# Patient Record
Sex: MALE
[De-identification: names, ages, dates, MRNs, and addresses within clinical notes are randomized per-mention and may not be internally consistent; named-entity substitution may affect disease eponyms.]

---

## 2017-05-11 ENCOUNTER — HOSPITAL ENCOUNTER (EMERGENCY)
Dept: HOSPITAL 42 - ED | Age: 52
Discharge: HOME | End: 2017-05-11
Payer: MEDICARE

## 2017-05-11 VITALS
OXYGEN SATURATION: 100 % | HEART RATE: 69 BPM | DIASTOLIC BLOOD PRESSURE: 58 MMHG | SYSTOLIC BLOOD PRESSURE: 134 MMHG | RESPIRATION RATE: 18 BRPM

## 2017-05-11 VITALS — BODY MASS INDEX: 40.2 KG/M2

## 2017-05-11 VITALS — TEMPERATURE: 98.1 F

## 2017-05-11 DIAGNOSIS — M62.838: ICD-10-CM

## 2017-05-11 DIAGNOSIS — R51: ICD-10-CM

## 2017-05-11 DIAGNOSIS — M54.2: Primary | ICD-10-CM
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
## 2019-01-04 ENCOUNTER — HOSPITAL ENCOUNTER (EMERGENCY)
Dept: HOSPITAL 42 - ED | Age: 54
Discharge: LEFT BEFORE DISCHARGE | End: 2019-01-04
Payer: MEDICARE

## 2019-01-04 VITALS — RESPIRATION RATE: 18 BRPM | TEMPERATURE: 98.4 F

## 2019-01-04 VITALS — OXYGEN SATURATION: 98 % | DIASTOLIC BLOOD PRESSURE: 90 MMHG | SYSTOLIC BLOOD PRESSURE: 139 MMHG | HEART RATE: 88 BPM

## 2019-01-04 VITALS — BODY MASS INDEX: 40.2 KG/M2

## 2019-01-04 DIAGNOSIS — Z98.84: ICD-10-CM

## 2019-01-04 DIAGNOSIS — I10: Primary | ICD-10-CM

## 2019-01-04 DIAGNOSIS — M79.605: ICD-10-CM

## 2019-01-04 DIAGNOSIS — R73.9: ICD-10-CM

## 2019-01-04 DIAGNOSIS — J44.9: ICD-10-CM

## 2019-01-04 DIAGNOSIS — F17.210: ICD-10-CM

## 2019-01-04 PROCEDURE — 96372 THER/PROPH/DIAG INJ SC/IM: CPT

## 2019-01-04 PROCEDURE — 99284 EMERGENCY DEPT VISIT MOD MDM: CPT

## 2019-01-04 PROCEDURE — 73560 X-RAY EXAM OF KNEE 1 OR 2: CPT

## 2019-01-04 PROCEDURE — 93971 EXTREMITY STUDY: CPT

## 2019-01-04 PROCEDURE — 82948 REAGENT STRIP/BLOOD GLUCOSE: CPT

## 2019-01-04 PROCEDURE — 73610 X-RAY EXAM OF ANKLE: CPT

## 2019-01-04 NOTE — RAD
PROCEDURE:  Left Knee Radiographs.



HISTORY:

leg pain



COMPARISON:

None available.



FINDINGS:

Two views 



BONES:

No acute displaced fracture. 



JOINTS:

No dislocation. 



JOINT EFFUSION:

Moderate to large suprapatellar joint effusion. 



OTHER FINDINGS:

None.



IMPRESSION:

Moderate to large suprapatellar joint effusion. 



No acute displaced fracture or dislocation identified.  If symptoms 

persist, or if there is continued clinical concern, x-ray follow-up 

in 7-10 days should be considered.

## 2019-01-04 NOTE — RAD
PROCEDURE:  Left Ankle Radiographs.



HISTORY:

 leg pain 



COMPARISON:

None available.



FINDINGS:



BONES:

No acute displaced fracture.  Calcaneal enthesophyte/heel spur. 



JOINTS:

No dislocation. 



SOFT TISSUES:

Marked soft tissue swelling/edema.  No evidence of radiopaque foreign 

body. 



OTHER FINDINGS:

None.



IMPRESSION:

Marked soft tissue swelling/edema. 



No acute displaced fracture, dislocation, or significant joint 

effusion identified.



If symptoms persist or if there is clinical concern, x-ray follow-up 

in 7-10 days should be considered.

## 2019-01-04 NOTE — US
PROCEDURE:  Left lower extremity venous US



HISTORY:

Leg pain and swelling. Evaluate for DVT.



PHYSICIAN(S):  Fabian Perez MD.



TECHNIQUE:

Duplex sonography and color-flow Doppler with graded compression were 

used to evaluate the deep venous system of the left lower extremity. 



FINDINGS:

The visualized deep venous system of the left lower extremity is 

sonographically normal and compressible. Normal wave forms and 

augmentation are seen. There is no sonographic evidence for deep 

venous thrombosis in the visualized segments of the left lower 

extremity.



IMPRESSION:

1. No sonographic evidence for deep venous thrombosis in the 

visualized segments of the left lower extremity.

## 2019-01-04 NOTE — ED PDOC
Arrival/HPI





- General


Chief Complaint: Lower Extremity Problem/Injury


Time Seen by Provider: 01/04/19 09:15


Historian: Patient





- History of Present Illness


Narrative History of Present Illness (Text): 





01/04/19 9:49


53 year old male, with past medical history of hypertension, presents to the ED 

complaining of left lower leg pain x 1 week. Patient states pain starts from the

left knee and radiates down to his lower leg, associated with intermittent 

paresthesias at the level of the knee. Patient informs difficulty bearing weight

and worsening symptoms with movement. Patient denies taking any medication for 

the pain. Patient denies any recent trauma or injury to the left leg. Patient 

denies any recent surgery or immobilization. Of note, patient did not take his 

HTN medication today. Patient denies any fevers, chills, headache, dizziness, 

changes in vision, chest pain, shortness of breath, abdominal pain, nausea, 

vomiting, back pain, neck pain, or any other complaints. 


Time/Duration: 1 week


Symptom Onset: Gradual


Symptom Course: Unchanged


Activities at Onset: Light


Context: Home





Past Medical History





- Provider Review


Nursing Documentation Reviewed: Yes





- Cardiac


Hx Cardiac Disorders: Yes


Hx Hypertension: Yes





- Pulmonary


Hx Respiratory Disorders: Yes


Hx Chronic Obstructive Pulmonary Disease (COPD): Yes





- Neurological


Hx Neurological Disorder: No





- HEENT


Hx HEENT Disorder: No





- Renal


Hx Renal Disorder: No





- Endocrine/Metabolic


Hx Endocrine Disorders: No





- Hematological/Oncological


Hx Blood Disorders: No





- Integumentary


Hx Dermatological Disorder: No





- Musculoskeletal/Rheumatological


Hx Musculoskeletal Disorders: No





- Gastrointestinal


Hx Gastrointestinal Disorders: No





- Genitourinary/Gynecological


Hx Genitourinary Disorders: No





- Psychiatric


Hx Psychophysiologic Disorder: Yes


Hx Bipolar Disorder: Yes


Hx Depression: Yes


Hx Substance Use: No





- Surgical History


Hx Gastric Bypass Surgery: Yes (lap band)


Hx Orthopedic Surgery: Yes





- Anesthesia


Hx Anesthesia: Yes


Hx Anesthesia Reactions: No


Hx Malignant Hyperthermia: No





- Suicidal Assessment


Feels Threatened In Home Enviroment: No





Family/Social History





- Physician Review


Nursing Documentation Reviewed: Yes


Family/Social History: Unknown Family HX


Smoking Status: Heavy Smoker > 10 Cigarettes Daily


Hx Alcohol Use: No


Hx Substance Use: No





Allergies/Home Meds


Allergies/Adverse Reactions: 


Allergies





No Known Allergies Allergy (Verified 05/11/17 09:14)


   








Home Medications: 


                                    Home Meds











 Medication  Instructions  Recorded  Confirmed


 


Lisinopril 10 mg PO DAILY 03/26/14 05/11/17


 


Paxil 40 mg PO DAILY 03/26/14 05/11/17


 


Simvastatin 20 mg PO DAILY 03/26/14 05/11/17


 


Tamsulosin [Flomax] 0.4 mg PO DAILY 05/11/17 05/11/17


 


Thiothixene [Navane] 20 mg PO HS 05/11/17 05/11/17














Review of Systems





- Physician Review


All systems were reviewed & negative as marked: Yes





- Review of Systems


Constitutional: Normal.  absent: Fevers


Eyes: Normal.  absent: Vision Changes


ENT: Normal


Respiratory: Normal.  absent: SOB, Cough


Cardiovascular: Normal.  absent: Chest Pain


Gastrointestinal: Normal.  absent: Abdominal Pain, Diarrhea, Nausea, Vomiting


Genitourinary Male: Normal.  absent: Dysuria, Frequency, Urinary Output Changes


Musculoskeletal: Other (left lower leg pain).  absent: Back Pain, Neck Pain


Skin: Normal.  absent: Rash


Neurological: Normal.  absent: Headache, Dizziness, Focal Weakness, Gait Changes


Endocrine: Normal





Physical Exam


Vital Signs Reviewed: Yes





Vital Signs











  Temp Pulse Resp BP Pulse Ox


 


 01/04/19 09:14  98.4 F  73  18  138/101 H  96











Temperature: Afebrile


Blood Pressure: Hypertensive


Pulse: Regular


Respiratory Rate: Normal


Appearance: Positive for: Well-Appearing, Non-Toxic, Comfortable


Pain Distress: None


Mental Status: Positive for: Alert and Oriented X 3





- Systems Exam


Head: Present: Atraumatic, Normocephalic


Pupils: Present: PERRL


Extroacular Muscles: Present: EOMI


Conjunctiva: Present: Normal


Mouth: Present: Moist Mucous Membranes


Neck: Present: Normal Range of Motion


Respiratory/Chest: Present: Clear to Auscultation, Good Air Exchange.  No: 

Respiratory Distress, Accessory Muscle Use


Cardiovascular: Present: Regular Rate and Rhythm, Normal S1, S2.  No: Murmurs


Abdomen: No: Tenderness, Distention, Peritoneal Signs


Back: Present: Normal Inspection.  No: CVA Tenderness, Midline Tenderness, 

Paraspinal Tenderness


Upper Extremity: Present: Normal Inspection, Normal ROM, NORMAL PULSES, 

Neurovascularly Intact, Capillary Refill < 2s.  No: Cyanosis, Edema, Temperature

 Abnormalties


Lower Extremity: Present: Edema (mild left anterior knee), NORMAL PULSES, Normal

 ROM, Tenderness (tenderness to left knee medial and lateral joint lines and 

over the achilles tendon), Neurovascularly Intact (distal pulses 2/2, strength 

5/5, and sensation intact bilaterally), Capillary Refill < 2 s, Other (Vericose 

veins to bilateral lower extremity).  No: CALF TENDERNESS, Swelling, Deformity, 

Temperature Abnormalties


Neurological: Present: GCS=15, CN II-XII Intact, Speech Normal, Motor Func 

Grossly Intact, Normal Sensory Function, Gait Normal


Skin: Present: Warm, Dry, Normal Color.  No: Rashes


Psychiatric: Present: Alert, Oriented x 3, Normal Insight, Normal Concentration





Medical Decision Making


ED Course and Treatment: 





01/04/19 9:51


Impression: 53 year old male presents to the ED complaining of left lower 

extremity pain since 1 week.





Plan:


-- Toradol


-- X-ray of left Ankle


-- X-ray of Left Knee


-- US of LE


-- Fingerstick


-- Reassess and disposition





Prior Visits:


Notes and results from previous visits were reviewed. 





Progress Notes:


Prelim venous duplex negative for DVT


XR show moderate joint effusion to knee and soft tissue swelling to ankle


Reports decreased pain after medications.





Fingerstick 317





Patient refusing IVF and insulin to treat incidental hyperglycemia. Asking to 

leave AMA. 





The patient is choosing to leave against medical advice.  I have personally 

explained to the patient that choosing to do so may result in permanent bodily 

harm, disability, or death.  I have discussed at great length that without 

further evaluation and monitoring there may be unforeseen circumstances and/or 

deterioration causing permanent bodily harm or death as a result of their 

choice. The patient is alert, oriented, and shows the mental capacity to make 

clear decisions regarding the patients health care at this time. The patient 

continues to wish to leave against medical advice.  


In light of the patients decision to leave against medical advice, follow-up 

has been arranged and the patient is aware of the importance to following up as 

instructed.  The patient has been advised that they should return to the 

emergency room immediately if they change their mind at any time, or if their 

condition begins to change or worsen in any way.





Patient given copies of XR reports and advised to followup with orthopedics. 

Educated in depth on the dangers of uncontrolled hyperglycemia and the 

importance of following up as soon as possible with PMD.





- RAD Interpretation


Narrative RAD Interpretations (Text): 


Left Knee XR:


FINDINGS:


Two views 


BONES:


No acute displaced fracture. 


JOINTS:


No dislocation. 


JOINT EFFUSION:


Moderate to large suprapatellar joint effusion. 


OTHER FINDINGS:


None.


IMPRESSION:


Moderate to large suprapatellar joint effusion. 


No acute displaced fracture or dislocation identified.  If symptoms persist, or 

if there is continued clinical concern, x-ray follow-up in 7-10 days should be 

considered.





Left Ankle XR:


FINDINGS:


BONES:


No acute displaced fracture.  Calcaneal enthesophyte/heel spur. 


JOINTS:


No dislocation. 


SOFT TISSUES:


Marked soft tissue swelling/edema.  No evidence of radiopaque foreign body. 


OTHER FINDINGS:


None.


IMPRESSION:


Marked soft tissue swelling/edema. 


No acute displaced fracture, dislocation, or significant joint effusion 

identified.


If symptoms persist or if there is clinical concern, x-ray follow-up in 7-10 

days should be considered.





Radiology Orders: 











01/04/19 09:49


ANKLE LEFT 3 VIEWS ROUTINE [RAD] Stat 


KNEE LEFT 2 VIEWS (AP & LAT) [RAD] Stat 





01/04/19 09:50


DUPLEX LOWER EXTRM VEIN LEFT [US] Stat 














- Medication Orders


Current Medication Orders: 














Discontinued Medications





Ketorolac Tromethamine (Toradol)  60 mg IM ONCE ONE


   Stop: 01/04/19 10:01











- Scribe Statement


The provider has reviewed the documentation as recorded by the Scribe


Idania Greene.





All medical record entries made by the Scribe were at my direction and 

personally dictated by me. I have reviewed the chart and agree that the record 

accurately reflects my personal performance of the history, physical exam, 

medical decision making, and the department course for this patient. I have also

 personally directed, reviewed, and agree with the discharge instructions and 

disposition.








Disposition/Present on Arrival





- Present on Arrival


Any Indicators Present on Arrival: No


History of DVT/PE: No


History of Uncontrolled Diabetes: No


Urinary Catheter: No


History of Decub. Ulcer: No


History Surgical Site Infection Following: None





- Disposition


Have Diagnosis and Disposition been Completed?: No


Diagnosis: 


 Left against medical advice, Elevated blood pressure reading, Hyperglycemia, 

Leg pain, left





Disposition: AGAINST MEDICAL ADVICE


Disposition Time: 11:20


Condition: STABLE


Discharge Instructions (ExitCare):  High Blood Pressure in Adults, 

Hyperglycemia, Adult, The ABCs of Diabetes, Leaving Against Medical Advice


Additional Instructions: 


Followup with primary doctor today 


Take home medications as prescribed


Followup with orthopedic doctor within 2 days


Rest, no strenuous activity


Please return if you wish to be re-evaluated or if new/worsening symptoms 

develop


Prescriptions: 


Naproxen [Naprosyn] 500 mg PO DAILY PRN #14 tablet


 PRN Reason: Pain, Moderate (4-7)


Referrals: 


Meghan Mantilla MD [Primary Care Provider] - Follow up with primary


Kiki Ram MD [Staff Provider] - Follow up with primary


Forms:  [facility] [facility] (English)
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
